# Patient Record
(demographics unavailable — no encounter records)

---

## 2025-02-25 NOTE — REASON FOR VISIT
[New Patient Visit] : a new patient visit [FreeTextEntry1] : CHRONIC HEADACHES; HISTORY OF LP SHUNT REMOVAL

## 2025-02-25 NOTE — PHYSICAL EXAM
[General Appearance - Alert] : alert [General Appearance - In No Acute Distress] : in no acute distress [Oriented To Time, Place, And Person] : oriented to person, place, and time [Impaired Insight] : insight and judgment were intact [Affect] : the affect was normal [Cranial Nerves Oculomotor (III)] : extraocular motion intact [Cranial Nerves Trigeminal (V)] : facial sensation intact symmetrically [Cranial Nerves Facial (VII)] : face symmetrical [Cranial Nerves Vestibulocochlear (VIII)] : hearing was intact bilaterally [Cranial Nerves Glossopharyngeal (IX)] : tongue and palate midline [Cranial Nerves Accessory (XI - Cranial And Spinal)] : head turning and shoulder shrug symmetric [Cranial Nerves Hypoglossal (XII)] : there was no tongue deviation with protrusion [Motor Tone] : muscle tone was normal in all four extremities [Motor Strength] : muscle strength was normal in all four extremities [Involuntary Movements] : no involuntary movements were seen [Sensation Tactile Decrease] : light touch was intact [Abnormal Walk] : normal gait [Balance] : balance was intact [Full ROM] : full ROM [Able to toe walk] : the patient was able to toe walk [Able to heel walk] : the patient was able to heel walk [Sclera] : the sclera and conjunctiva were normal [Extraocular Movements] : extraocular movements were intact [Outer Ear] : the ears and nose were normal in appearance [] : no respiratory distress [No Spinal Tenderness] : no spinal tenderness [Over the Past 2 Weeks, Have You Felt Down, Depressed, or Hopeless?] : 1.) Over the past 2 weeks, have you felt down, depressed, or hopeless? No [Over the Past 2 Weeks, Have You Felt Little Interest or Pleasure Doing Things?] : 2.) Over the past 2 weeks, have you felt little interest or pleasure doing things? No [Romberg's Sign] : Romberg's sign was negtive [Limited Balance] : balance was intact [Past-pointing] : there was no past-pointing [Tremor] : no tremor present [Dysdiadochokinesia Bilaterally] : not present [Coordination - Dysmetria Impaired Finger-to-Nose Bilateral] : not present [Coordination - Dysmetria Impaired Heel-to-Shin Bilateral] : not present

## 2025-02-25 NOTE — HISTORY OF PRESENT ILLNESS
[de-identified] : ROSALES BAKER  is a 47 year old female with PMH Prediabetes, LumboPeritoneal Shunt Removal [facility] with MD [place] 2013? with complaints of  persistent and chronic headaches for 5-10 years that has minimal relief with OTC medication naproxen and rest. She reports Frequent urination for several years and states this was related to her previous hydrocephalus in the past. She denies change in cognition, inbalance or gait instability or urine incontinence.  Denies changes in speech/vision, nausea/vomiting, numbness/tingling/weakness in extremities, abnormal movement of extremities.

## 2025-02-25 NOTE — ASSESSMENT
[FreeTextEntry1] : PLAN: Ms. ROSALES BAKER  presents for  persistent headaches and history of hydrocephalus with removed LP shunt complaints and we discussed the plan listed below. The patient verbalized understanding of the plan of care and agrees to proceed. - MR non contrast w/ csf flow - follow-up after MRI - Follow with PCP for urinary complaints worsening    I have spent  30 minutes on this encounter.

## 2025-04-02 NOTE — ASSESSMENT
[FreeTextEntry1] :  My impression is that the patient suffers from  .   The patients established problem of  is.  Th e differential diagnosis is.  Her KPS is . I had a long discussion with the patient regarding the role of.  The patient was extensively educated about the nature of her disease process. Therapeutic and diagnostic tests include.  The patient should see.  I will see the patient back in. I have explained the alternatives, risks and benefits to the patient andshe understands and agrees to proceed.  This risk of the procedure including but not limited to pain, infection, seizure, stroke, recurrence, residual disease, neurovascular injury, heart attack, pulmonary embolism, blindness, weakness, paralysis and death have been carefully explained to the patient who clearly understands and agrees to proceed.

## 2025-04-02 NOTE — HISTORY OF PRESENT ILLNESS
[de-identified] : ROSALES BAKER  is a 47 year old female with PMH Prediabetes, LumboPeritoneal Shunt Removal [facility] with MD [place] 2013? with complaints of  persistent and chronic headaches for 5-10 years that has minimal relief with OTC medication naproxen and rest. She reports Frequent urination for several years and states this was related to her previous hydrocephalus in the past. She denies change in cognition, inbalance or gait instability or urine incontinence.  Denies changes in speech/vision, nausea/vomiting, numbness/tingling/weakness in extremities, abnormal movement of extremities.  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 Records indiacted that her CSF leak persisted  throught the lumbar peritoneal shunt incision and lumbar drain site and she had reconstruction of cerebrospinal fluid zi with bilateral paraspinal muscle flaps on 10/11/11. Placement of a new Lumbar drain was placed at l5-s1 after her LP shunt was removed.

## 2025-04-02 NOTE — HISTORY OF PRESENT ILLNESS
[de-identified] : ROSALES BAKER  is a 47 year old female with PMH Prediabetes, LumboPeritoneal Shunt Removal [facility] with MD [place] 2013? with complaints of  persistent and chronic headaches for 5-10 years that has minimal relief with OTC medication naproxen and rest. She reports Frequent urination for several years and states this was related to her previous hydrocephalus in the past. She denies change in cognition, inbalance or gait instability or urine incontinence.  Denies changes in speech/vision, nausea/vomiting, numbness/tingling/weakness in extremities, abnormal movement of extremities.  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 Records indiacted that her CSF leak persisted  throught the lumbar peritoneal shunt incision and lumbar drain site and she had reconstruction of cerebrospinal fluid zi with bilateral paraspinal muscle flaps on 10/11/11. Placement of a new Lumbar drain was placed at l5-s1 after her LP shunt was removed.

## 2025-04-02 NOTE — HISTORY OF PRESENT ILLNESS
[de-identified] : ROSALES BAKER  is a 47 year old female with PMH Prediabetes, LumboPeritoneal Shunt Removal [facility] with MD [place] 2013? with complaints of  persistent and chronic headaches for 5-10 years that has minimal relief with OTC medication naproxen and rest. She reports Frequent urination for several years and states this was related to her previous hydrocephalus in the past. She denies change in cognition, inbalance or gait instability or urine incontinence.  Denies changes in speech/vision, nausea/vomiting, numbness/tingling/weakness in extremities, abnormal movement of extremities.  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 Records indiacted that her CSF leak persisted  throught the lumbar peritoneal shunt incision and lumbar drain site and she had reconstruction of cerebrospinal fluid zi with bilateral paraspinal muscle flaps on 10/11/11. Placement of a new Lumbar drain was placed at l5-s1 after her LP shunt was removed.

## 2025-04-02 NOTE — HISTORY OF PRESENT ILLNESS
[de-identified] : ROSALES BAKER  is a 47 year old female with PMH Prediabetes, LumboPeritoneal Shunt Removal [facility] with MD [place] 2013? with complaints of  persistent and chronic headaches for 5-10 years that has minimal relief with OTC medication naproxen and rest. She reports Frequent urination for several years and states this was related to her previous hydrocephalus in the past. She denies change in cognition, inbalance or gait instability or urine incontinence.  Denies changes in speech/vision, nausea/vomiting, numbness/tingling/weakness in extremities, abnormal movement of extremities.  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 Records indiacted that her CSF leak persisted  throught the lumbar peritoneal shunt incision and lumbar drain site and she had reconstruction of cerebrospinal fluid zi with bilateral paraspinal muscle flaps on 10/11/11. Placement of a new Lumbar drain was placed at l5-s1 after her LP shunt was removed.